# Patient Record
Sex: FEMALE | Race: WHITE | Employment: OTHER | URBAN - METROPOLITAN AREA
[De-identification: names, ages, dates, MRNs, and addresses within clinical notes are randomized per-mention and may not be internally consistent; named-entity substitution may affect disease eponyms.]

---

## 2017-02-05 ENCOUNTER — APPOINTMENT (OUTPATIENT)
Dept: GENERAL RADIOLOGY | Facility: HOSPITAL | Age: 82
DRG: 189 | End: 2017-02-05
Attending: EMERGENCY MEDICINE
Payer: MEDICARE

## 2017-02-05 ENCOUNTER — HOSPITAL ENCOUNTER (INPATIENT)
Facility: HOSPITAL | Age: 82
LOS: 5 days | Discharge: NURSING, REHAB OR LONG-TERM CARE | DRG: 189 | End: 2017-02-10
Attending: EMERGENCY MEDICINE | Admitting: STUDENT IN AN ORGANIZED HEALTH CARE EDUCATION/TRAINING PROGRAM
Payer: MEDICARE

## 2017-02-05 DIAGNOSIS — R79.89 ELEVATED BRAIN NATRIURETIC PEPTIDE (BNP) LEVEL: ICD-10-CM

## 2017-02-05 DIAGNOSIS — I48.91 ATRIAL FIBRILLATION WITH RAPID VENTRICULAR RESPONSE (HCC): ICD-10-CM

## 2017-02-05 DIAGNOSIS — R53.83 OTHER FATIGUE: Primary | ICD-10-CM

## 2017-02-05 DIAGNOSIS — J11.1 INFLUENZA: ICD-10-CM

## 2017-02-05 DIAGNOSIS — Z71.89 COUNSELING REGARDING GOALS OF CARE: ICD-10-CM

## 2017-02-05 DIAGNOSIS — Z51.5 PALLIATIVE CARE ENCOUNTER: ICD-10-CM

## 2017-02-05 DIAGNOSIS — J44.1 COPD EXACERBATION (HCC): ICD-10-CM

## 2017-02-05 PROBLEM — R73.9 HYPERGLYCEMIA: Status: ACTIVE | Noted: 2017-02-05

## 2017-02-05 PROBLEM — E87.6 HYPOKALEMIA: Status: ACTIVE | Noted: 2017-02-05

## 2017-02-05 PROBLEM — E87.3 METABOLIC ALKALOSIS: Status: ACTIVE | Noted: 2017-02-05

## 2017-02-05 LAB
ALBUMIN SERPL-MCNC: 3.3 G/DL (ref 3.5–4.8)
ALLENS TEST: POSITIVE
ALP LIVER SERPL-CCNC: 98 U/L (ref 55–142)
ALT SERPL-CCNC: 26 U/L (ref 14–54)
ANTIBODY SCREEN: NEGATIVE
ARTERIAL BLD GAS O2 SATURATION: 95 % (ref 92–100)
ARTERIAL BLOOD GAS BASE EXCESS: 5.7
ARTERIAL BLOOD GAS HCO3: 33.3 MEQ/L (ref 22–26)
ARTERIAL BLOOD GAS PCO2: 63 MM HG (ref 35–45)
ARTERIAL BLOOD GAS PH: 7.34 (ref 7.35–7.45)
ARTERIAL BLOOD GAS PO2: 84 MM HG (ref 80–105)
AST SERPL-CCNC: 30 U/L (ref 15–41)
BASOPHILS # BLD AUTO: 0.08 X10(3) UL (ref 0–0.1)
BASOPHILS NFR BLD AUTO: 0.9 %
BILIRUB SERPL-MCNC: 0.4 MG/DL (ref 0.1–2)
BILIRUB UR QL STRIP.AUTO: NEGATIVE
BUN BLD-MCNC: 18 MG/DL (ref 8–20)
CALCIUM BLD-MCNC: 8.9 MG/DL (ref 8.3–10.3)
CALCULATED O2 SATURATION: 96 % (ref 92–100)
CARBOXYHEMOGLOBIN: 1.6 % SAT (ref 0–3)
CHLORIDE: 101 MMOL/L (ref 101–111)
CO2: 35 MMOL/L (ref 22–32)
COLOR UR AUTO: YELLOW
CREAT BLD-MCNC: 0.83 MG/DL (ref 0.55–1.02)
EOSINOPHIL # BLD AUTO: 0.02 X10(3) UL (ref 0–0.3)
EOSINOPHIL NFR BLD AUTO: 0.2 %
ERYTHROCYTE [DISTWIDTH] IN BLOOD BY AUTOMATED COUNT: 14.4 % (ref 11.5–16)
EST. AVERAGE GLUCOSE BLD GHB EST-MCNC: 114 MG/DL (ref 68–126)
EXPIRATORY PRESSURE: 5 CM H2O
FIO2: 35 %
GLUCOSE BLD-MCNC: 166 MG/DL (ref 70–99)
GLUCOSE UR STRIP.AUTO-MCNC: NEGATIVE MG/DL
HAV IGM SER QL: 2 MG/DL (ref 1.7–3)
HBA1C MFR BLD HPLC: 5.6 % (ref ?–5.7)
HCT VFR BLD AUTO: 41.4 % (ref 34–50)
HGB BLD-MCNC: 13.5 G/DL (ref 12–16)
IMMATURE GRANULOCYTE COUNT: 0.12 X10(3) UL (ref 0–1)
IMMATURE GRANULOCYTE RATIO %: 1.3 %
INSP PRESSURE: 12 CM H2O
KETONES UR STRIP.AUTO-MCNC: NEGATIVE MG/DL
LEUKOCYTE ESTERASE UR QL STRIP.AUTO: NEGATIVE
LYMPHOCYTES # BLD AUTO: 0.57 X10(3) UL (ref 0.9–4)
LYMPHOCYTES NFR BLD AUTO: 6.3 %
M PROTEIN MFR SERPL ELPH: 7.5 G/DL (ref 6.1–8.3)
MCH RBC QN AUTO: 31.3 PG (ref 27–33.2)
MCHC RBC AUTO-ENTMCNC: 32.6 G/DL (ref 31–37)
MCV RBC AUTO: 96.1 FL (ref 81–100)
METHEMOGLOBIN: 0.8 % SAT (ref 0.4–1.5)
MONOCYTES # BLD AUTO: 1.09 X10(3) UL (ref 0.1–0.6)
MONOCYTES NFR BLD AUTO: 12.1 %
NEUTROPHIL ABS PRELIM: 7.1 X10 (3) UL (ref 1.3–6.7)
NEUTROPHILS # BLD AUTO: 7.1 X10(3) UL (ref 1.3–6.7)
NEUTROPHILS NFR BLD AUTO: 79.2 %
NITRITE UR QL STRIP.AUTO: NEGATIVE
PATIENT TEMPERATURE: 98.6 F
PH UR STRIP.AUTO: 6 [PH] (ref 4.5–8)
PLATELET # BLD AUTO: 237 10(3)UL (ref 150–450)
POTASSIUM SERPL-SCNC: 3.5 MMOL/L (ref 3.6–5.1)
PRO-BETA NATRIURETIC PEPTIDE: 6830 PG/ML (ref ?–450)
PROT UR STRIP.AUTO-MCNC: 30 MG/DL
RBC # BLD AUTO: 4.31 X10(6)UL (ref 3.8–5.1)
RBC UR QL AUTO: NEGATIVE
RED CELL DISTRIBUTION WIDTH-SD: 50.2 FL (ref 35.1–46.3)
RH BLOOD TYPE: POSITIVE
SODIUM SERPL-SCNC: 140 MMOL/L (ref 136–144)
SP GR UR STRIP.AUTO: 1.01 (ref 1–1.03)
TOTAL HEMOGLOBIN: 12.5 G/DL (ref 11.7–16)
TROPONIN: <0.046 NG/ML (ref ?–0.05)
TSI SER-ACNC: 1.45 MIU/ML (ref 0.35–5.5)
UROBILINOGEN UR STRIP.AUTO-MCNC: <2 MG/DL
VENOUS BASE EXCESS: 5.8
VENOUS BLOOD GAS HCO3: 35.8 MEQ/L (ref 23–27)
VENOUS LITERS PER MINUTE: 5 L/MIN
VENOUS O2 SAT CALC: 74 % (ref 72–78)
VENOUS O2 SATURATION: 78 % (ref 72–78)
VENOUS PCO2: 81 MM HG (ref 38–50)
VENOUS PH: 7.26 (ref 7.33–7.43)
VENOUS PO2: 45 MM HG (ref 30–50)
VENT RATE: 18 /MIN
WBC # BLD AUTO: 9 X10(3) UL (ref 4–13)

## 2017-02-05 PROCEDURE — 71010 XR CHEST AP PORTABLE  (CPT=71010): CPT

## 2017-02-05 PROCEDURE — 5A09357 ASSISTANCE WITH RESPIRATORY VENTILATION, LESS THAN 24 CONSECUTIVE HOURS, CONTINUOUS POSITIVE AIRWAY PRESSURE: ICD-10-PCS | Performed by: EMERGENCY MEDICINE

## 2017-02-05 PROCEDURE — 99223 1ST HOSP IP/OBS HIGH 75: CPT | Performed by: HOSPITALIST

## 2017-02-05 RX ORDER — METOPROLOL SUCCINATE 50 MG/1
50 TABLET, EXTENDED RELEASE ORAL DAILY
COMMUNITY
End: 2017-02-21

## 2017-02-05 RX ORDER — SODIUM CHLORIDE 9 MG/ML
1000 INJECTION, SOLUTION INTRAVENOUS CONTINUOUS
Status: DISCONTINUED | OUTPATIENT
Start: 2017-02-05 | End: 2017-02-05

## 2017-02-05 RX ORDER — IPRATROPIUM BROMIDE AND ALBUTEROL SULFATE 2.5; .5 MG/3ML; MG/3ML
3 SOLUTION RESPIRATORY (INHALATION) EVERY 4 HOURS
Status: DISCONTINUED | OUTPATIENT
Start: 2017-02-05 | End: 2017-02-07

## 2017-02-05 RX ORDER — METHYLPREDNISOLONE SODIUM SUCCINATE 125 MG/2ML
125 INJECTION, POWDER, LYOPHILIZED, FOR SOLUTION INTRAMUSCULAR; INTRAVENOUS ONCE
Status: COMPLETED | OUTPATIENT
Start: 2017-02-05 | End: 2017-02-05

## 2017-02-05 RX ORDER — LEVOTHYROXINE SODIUM 0.05 MG/1
50 TABLET ORAL
COMMUNITY
End: 2017-02-21

## 2017-02-05 RX ORDER — SODIUM CHLORIDE 9 MG/ML
INJECTION, SOLUTION INTRAVENOUS CONTINUOUS
Status: DISCONTINUED | OUTPATIENT
Start: 2017-02-05 | End: 2017-02-07

## 2017-02-05 RX ORDER — METOPROLOL SUCCINATE 50 MG/1
50 TABLET, EXTENDED RELEASE ORAL
Status: DISCONTINUED | OUTPATIENT
Start: 2017-02-06 | End: 2017-02-10

## 2017-02-05 RX ORDER — METHYLPREDNISOLONE SODIUM SUCCINATE 125 MG/2ML
80 INJECTION, POWDER, LYOPHILIZED, FOR SOLUTION INTRAMUSCULAR; INTRAVENOUS EVERY 8 HOURS
Status: DISCONTINUED | OUTPATIENT
Start: 2017-02-06 | End: 2017-02-06

## 2017-02-05 RX ORDER — DILTIAZEM HYDROCHLORIDE 5 MG/ML
INJECTION INTRAVENOUS
Status: COMPLETED
Start: 2017-02-05 | End: 2017-02-05

## 2017-02-05 RX ORDER — MULTIVITAMIN
1 TABLET ORAL DAILY
COMMUNITY
End: 2017-02-05

## 2017-02-05 RX ORDER — ONDANSETRON 2 MG/ML
4 INJECTION INTRAMUSCULAR; INTRAVENOUS EVERY 6 HOURS PRN
Status: DISCONTINUED | OUTPATIENT
Start: 2017-02-05 | End: 2017-02-10

## 2017-02-05 RX ORDER — OSELTAMIVIR PHOSPHATE 75 MG/1
75 CAPSULE ORAL 2 TIMES DAILY
Status: DISCONTINUED | OUTPATIENT
Start: 2017-02-06 | End: 2017-02-10

## 2017-02-05 RX ORDER — LEVOTHYROXINE SODIUM 0.05 MG/1
50 TABLET ORAL
Status: DISCONTINUED | OUTPATIENT
Start: 2017-02-06 | End: 2017-02-10

## 2017-02-05 RX ORDER — I-VITE, TAB 1000-60-2MG (60/BT) 300MCG-200
2 TAB ORAL DAILY
COMMUNITY

## 2017-02-05 RX ORDER — DILTIAZEM HYDROCHLORIDE 180 MG/1
180 CAPSULE, EXTENDED RELEASE ORAL 2 TIMES DAILY
COMMUNITY
End: 2017-02-21

## 2017-02-05 RX ORDER — DILTIAZEM HYDROCHLORIDE 180 MG/1
180 CAPSULE, EXTENDED RELEASE ORAL 2 TIMES DAILY
Status: DISCONTINUED | OUTPATIENT
Start: 2017-02-05 | End: 2017-02-10

## 2017-02-05 RX ORDER — ACETAMINOPHEN 325 MG/1
650 TABLET ORAL EVERY 6 HOURS PRN
Status: DISCONTINUED | OUTPATIENT
Start: 2017-02-05 | End: 2017-02-10

## 2017-02-05 RX ORDER — PANTOPRAZOLE SODIUM 40 MG/1
40 TABLET, DELAYED RELEASE ORAL
Status: DISCONTINUED | OUTPATIENT
Start: 2017-02-06 | End: 2017-02-06

## 2017-02-05 RX ORDER — IBUPROFEN 400 MG/1
400 TABLET ORAL EVERY 6 HOURS PRN
COMMUNITY
End: 2017-02-05

## 2017-02-05 RX ORDER — DILTIAZEM HYDROCHLORIDE 5 MG/ML
10 INJECTION INTRAVENOUS ONCE
Status: COMPLETED | OUTPATIENT
Start: 2017-02-05 | End: 2017-02-05

## 2017-02-05 NOTE — ED INITIAL ASSESSMENT (HPI)
Pt with increased weakness, fatigue, and dyspnea. Pt on home oxygen at 2-2.5L/min. Pt was in hospital 1/24-26 and was diagnosed with \"a respiratory infection\".

## 2017-02-05 NOTE — ED PROVIDER NOTES
Patient Seen in: [facility] Emergency Department    History   Patient presents with:  Fatigue (constitutional, neurologic)    Stated Complaint: incresed shortness of breath and generalized weakness.     HPI    Patient is a 80-year-old female comes in daily.       No family history on file.       Smoking Status: Former Smoker                   Packs/Day: 0.00  Years:           Quit date: 02/05/1987      Review of Systems    Positive for stated complaint: incresed shortness of breath and generalized weakn Pro-Beta Natriuretic Peptide 6830 (*)     All other components within normal limits   URINALYSIS WITH CULTURE REFLEX - Abnormal; Notable for the following:     Clarity Urine Hazy (*)     Protein Urine 30  (*)     RBC URINE 6-10 (*)     Bacteria Urine Ra -----------         ------                     BLOOD TYPE, ABO AND RH M[[id number]]                         Final result               ANTIBODY [id number]]                                  Final result                 Please view results for these juanis follow-up provider specified.     Medications Prescribed:  Current Discharge Medication List        Present on Admission           ICD-10-CM Noted POA    * (Principal)Other fatigue R53.83 2/5/2017 Unknown    Atrial fibrillation with rapid ventricular respon

## 2017-02-06 ENCOUNTER — APPOINTMENT (OUTPATIENT)
Dept: GENERAL RADIOLOGY | Facility: HOSPITAL | Age: 82
DRG: 189 | End: 2017-02-06
Attending: NURSE PRACTITIONER
Payer: MEDICARE

## 2017-02-06 LAB
ALBUMIN SERPL-MCNC: 2.8 G/DL (ref 3.5–4.8)
ALLENS TEST: POSITIVE
ALP LIVER SERPL-CCNC: 78 U/L (ref 55–142)
ALT SERPL-CCNC: 21 U/L (ref 14–54)
ARTERIAL BLD GAS O2 SATURATION: 95 % (ref 92–100)
ARTERIAL BLOOD GAS BASE EXCESS: 5
ARTERIAL BLOOD GAS HCO3: 31.3 MEQ/L (ref 22–26)
ARTERIAL BLOOD GAS PCO2: 55 MM HG (ref 35–45)
ARTERIAL BLOOD GAS PH: 7.38 (ref 7.35–7.45)
ARTERIAL BLOOD GAS PO2: 83 MM HG (ref 80–105)
AST SERPL-CCNC: 22 U/L (ref 15–41)
BILIRUB SERPL-MCNC: 0.4 MG/DL (ref 0.1–2)
BUN BLD-MCNC: 23 MG/DL (ref 8–20)
CALCIUM BLD-MCNC: 8.6 MG/DL (ref 8.3–10.3)
CALCULATED O2 SATURATION: 96 % (ref 92–100)
CARBOXYHEMOGLOBIN: 1.4 % SAT (ref 0–3)
CHLORIDE: 104 MMOL/L (ref 101–111)
CO2: 34 MMOL/L (ref 22–32)
CREAT BLD-MCNC: 0.93 MG/DL (ref 0.55–1.02)
ERYTHROCYTE [DISTWIDTH] IN BLOOD BY AUTOMATED COUNT: 14.2 % (ref 11.5–16)
GLUCOSE BLD-MCNC: 167 MG/DL (ref 70–99)
HAV IGM SER QL: 2.1 MG/DL (ref 1.7–3)
HCT VFR BLD AUTO: 35.5 % (ref 34–50)
HGB BLD-MCNC: 11.6 G/DL (ref 12–16)
M PROTEIN MFR SERPL ELPH: 6.4 G/DL (ref 6.1–8.3)
MCH RBC QN AUTO: 31.2 PG (ref 27–33.2)
MCHC RBC AUTO-ENTMCNC: 32.7 G/DL (ref 31–37)
MCV RBC AUTO: 95.4 FL (ref 81–100)
METHEMOGLOBIN: 0.7 % SAT (ref 0.4–1.5)
PATIENT TEMPERATURE: 98.6 F
PLATELET # BLD AUTO: 187 10(3)UL (ref 150–450)
POTASSIUM SERPL-SCNC: 3.6 MMOL/L (ref 3.6–5.1)
POTASSIUM SERPL-SCNC: 4.9 MMOL/L (ref 3.6–5.1)
RBC # BLD AUTO: 3.72 X10(6)UL (ref 3.8–5.1)
RED CELL DISTRIBUTION WIDTH-SD: 49.5 FL (ref 35.1–46.3)
SODIUM SERPL-SCNC: 145 MMOL/L (ref 136–144)
TOTAL HEMOGLOBIN: 11.2 G/DL (ref 11.7–16)
WBC # BLD AUTO: 5 X10(3) UL (ref 4–13)

## 2017-02-06 PROCEDURE — 71010 XR CHEST AP PORTABLE  (CPT=71010): CPT

## 2017-02-06 PROCEDURE — 99233 SBSQ HOSP IP/OBS HIGH 50: CPT | Performed by: HOSPITALIST

## 2017-02-06 RX ORDER — PREDNISONE 10 MG/1
40 TABLET ORAL
Status: DISCONTINUED | OUTPATIENT
Start: 2017-02-06 | End: 2017-02-08

## 2017-02-06 RX ORDER — POTASSIUM CHLORIDE 20 MEQ/1
40 TABLET, EXTENDED RELEASE ORAL EVERY 4 HOURS
Status: COMPLETED | OUTPATIENT
Start: 2017-02-06 | End: 2017-02-06

## 2017-02-06 RX ORDER — DOCUSATE SODIUM 100 MG/1
100 CAPSULE, LIQUID FILLED ORAL 2 TIMES DAILY PRN
Status: DISCONTINUED | OUTPATIENT
Start: 2017-02-06 | End: 2017-02-10

## 2017-02-06 RX ORDER — AZITHROMYCIN 250 MG/1
500 TABLET, FILM COATED ORAL
Status: DISCONTINUED | OUTPATIENT
Start: 2017-02-06 | End: 2017-02-08

## 2017-02-06 NOTE — PHYSICAL THERAPY NOTE
PHYSICAL THERAPY EVALUATION - INPATIENT     Room Number: 455/455-A  Evaluation Date: 2/6/2017  Type of Evaluation: Initial  Physician Order: PT Eval and Treat    Presenting Problem: Flu, a-fib with RVR  Reason for Therapy: Mobility Dysfunction and Disc driving recently, (states daughter always has her car). Reports she has not been able to do laundry or prepare meals due to weakness and fatigue for several weeks.      SUBJECTIVE  No c/o pain, dizziness, nausea, with the exception of left knee pain when s assessment = 85/36; sitting prior to standing activity = 98/41. AM-PAC '6-Clicks' INPATIENT SHORT FORM - BASIC MOBILITY  How much difficulty does the patient currently have. ..  -   Turning over in bed (including adjusting bedclothes, sheets and blanket been falling. Functional activity tolerated    Patient End of Session: Up in chair; With [address] staff;Needs met;Call light within reach; All patient questions and concerns addressed;RN aware of session/findings    Eval completed.     ASSESSMENT     Patient is Compliance with activity recommendations.      Goal #5    Goal #6    Goal Comments: Goals established on 2/6/2017

## 2017-02-06 NOTE — PROGRESS NOTES
ICU  Critical Care APN Progress Note    NAME: Lianne Peña - ROOM: [address] [place] [address] - MRN: [id number] - Age: 80year old - :1935    History Of Present Illness:  Lianne Peña is a 80year old female who admits with weakness and shortness of breath that s was obtained. COMPARISON:  None. INDICATIONS:  incresed shortness of breath and generalized weakness. PATIENT STATED HISTORY:  Patient is here for weakness and increased shortness of breath. Congested cough.         2/5/2017  CONCLUSION:  Hyperinflation

## 2017-02-06 NOTE — H&P
MISTY HOSPITALIST  History and Physical     Deb Sanders Patient Status:  Emergency    1935 MRN [id number]   [address] Attending Dinesh Cortez MD   Hosp Day # 0 PCP None Pcp     Chief Complaint: Weakness 100%  General: Moderate respiratory distress on BIPAP  HEENT: Normocephalic atraumatic. Moist mucous membranes. Respiratory: Poor air exchange, mild wheeze, no crackles  Cardiovascular: S1, S2. Irregular  Abdomen: Soft, nontender, nondistended.   Positive

## 2017-02-06 NOTE — CONSULTS
Critical Care H&P/Consult       NAME: Kait Redmond - ROOM: [address] [place], [place] [address] - MRN: [id number] - Age: 80year old - :  1935    Date of Admission: 2017  3:13 PM  Admission Diagnosis: COPD exacerbation ([facility] [J44.1]  Atrial fibrillation with rapid ventri Oral Tab Take 2 tablets by mouth daily. Disp:  Rfl:  2/5/2017 at 1100   DiltiaZEM HCl  MG Oral Capsule SR 24 Hr Take 180 mg by mouth 2 (two) times daily.  Disp:  Rfl:  2/5/2017 at 1100     No Active Allergies    Social History:    Social History   Mar Q24H   • pantoprazole (PROTONIX) IV push  40 mg Intravenous QAM AC    Or   • Pantoprazole Sodium  40 mg Oral QAM AC   • Oseltamivir Phosphate  75 mg Oral BID     Continuous Infusing Medication:  • albuterol Sulfate     • Ipratropium Bromide     • sodium ch eyes   Throat:   Lips, mucosa, and tongue normal; teeth and gums normal   Neck:   Supple, symmetrical, trachea midline, no adenopathy;        thyroid:  No enlargement/tenderness/nodules; no carotid    bruit or JVD   Back:     Symmetric, no curvature, ROM n steroids  -need OPT pfts and outside records  -BD protocol  -azithro x 3 days  4. A-fib  -on eliquis  -cont, monitor rates  5. 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  H/o TB  -underwent 1 year of therapy per pt ~30yrs a

## 2017-02-06 NOTE — CM/SW NOTE
02/06/17 1500   CM/SW Referral Data   Referral Source Physician   Reason for Referral Discharge planning   Informant Patient; Children   Social History   Recreational Drug/Alcohol Use no   Major Changes Last 6 Months no   Domestic/Partner Violence no   S

## 2017-02-06 NOTE — PLAN OF CARE
CARDIOVASCULAR - ADULT    • Maintains optimal cardiac output and hemodynamic stability Progressing        NEUROLOGICAL - ADULT    • Achieves stable or improved neurological status Progressing        RESPIRATORY - ADULT    • Achieves optimal ventilation and

## 2017-02-06 NOTE — PROGRESS NOTES
MISTY HOSPITALIST  Progress Note     Keisha Escobar Patient Status:  Inpatient    1935 MRN [id number]   [facility] 4SW-A Attending Aviva More MD   Hosp Day # 1 PCP None Pcp     Chief Complaint: SOB    S: Patient reports she never • Levothyroxine Sodium  50 mcg Oral Before breakfast   • Metoprolol Succinate ER  50 mg Oral Daily Beta Blocker   • ipratropium-albuterol  3 mL Nebulization Q4H   • pantoprazole (PROTONIX) IV push  40 mg Intravenous QAM AC    Or   • Pantoprazole Sodium 98  78   AST  30  22   ALT  26  21   BILT  0.4  0.4   TP  7.5  6.4     Impression: Acute hypercapneic respiratory failure d/t COPD exacerbation triggered by influenza, improving    Plan:  Zithro  Steroid taper  Nebs  Discharge Elvira Ornelas MD

## 2017-02-06 NOTE — OCCUPATIONAL THERAPY NOTE
OCCUPATIONAL THERAPY EVALUATION - INPATIENT     Room Number: 455/455-A  Evaluation Date: 2/6/2017  Type of Evaluation: Initial  Presenting Problem: Influenza A; COPD exacerbation; AFIB w/ RVR; fatigue    Physician Order: IP Consult to Occupational Therapy )  Patient Regularly Uses: Glasses; Home O2    Prior Level of Pershing: Per patient, she was independent with self-care and functional mobility until 1 week ago without use of AD.   [place] moved [place] from [facility] 2/1/17 and states she bought a home her ADDITIONAL TESTS  Dynamometer  Strength Left: 25  Dynamometer  Strength Right: 30     MOCA: 21/30     Details from examination are as follow:    Visuospatial/Executive: 5/5  Naming: 3/3  Attention: 5/6  Language: 3/3  Abstraction: 2/2  Delayed TRANSFER ASSESSMENT  Supine to Sit : Not tested  Sit to Stand: Minimum assistance    Skilled Therapy Provided: Patient educated on OT role, goals, plan of care, recommendations and safety.   Patient transferred sit to stand from chair with min A and use of moderate complexity OT intervention to address the above deficits, maximizing patient's ability to return to prior level of function.     Patient is not safe to return home at current functional level d/t high risk for recurrent falls, decline in ADL partic

## 2017-02-07 LAB
ATRIAL RATE: 98 BPM
BUN BLD-MCNC: 36 MG/DL (ref 8–20)
CALCIUM BLD-MCNC: 7.9 MG/DL (ref 8.3–10.3)
CHLORIDE: 106 MMOL/L (ref 101–111)
CO2: 28 MMOL/L (ref 22–32)
CREAT BLD-MCNC: 0.9 MG/DL (ref 0.55–1.02)
ERYTHROCYTE [DISTWIDTH] IN BLOOD BY AUTOMATED COUNT: 14.5 % (ref 11.5–16)
GLUCOSE BLD-MCNC: 144 MG/DL (ref 70–99)
HCT VFR BLD AUTO: 31.7 % (ref 34–50)
HGB BLD-MCNC: 10.3 G/DL (ref 12–16)
MCH RBC QN AUTO: 31.3 PG (ref 27–33.2)
MCHC RBC AUTO-ENTMCNC: 32.5 G/DL (ref 31–37)
MCV RBC AUTO: 96.4 FL (ref 81–100)
PLATELET # BLD AUTO: 174 10(3)UL (ref 150–450)
POTASSIUM SERPL-SCNC: 4.6 MMOL/L (ref 3.6–5.1)
Q-T INTERVAL: 294 MS
QRS DURATION: 68 MS
QTC CALCULATION (BEZET): 395 MS
R AXIS: 109 DEGREES
RBC # BLD AUTO: 3.29 X10(6)UL (ref 3.8–5.1)
RED CELL DISTRIBUTION WIDTH-SD: 51.1 FL (ref 35.1–46.3)
SODIUM SERPL-SCNC: 139 MMOL/L (ref 136–144)
T AXIS: 57 DEGREES
VENTRICULAR RATE: 109 BPM
WBC # BLD AUTO: 13.9 X10(3) UL (ref 4–13)

## 2017-02-07 PROCEDURE — 99232 SBSQ HOSP IP/OBS MODERATE 35: CPT | Performed by: HOSPITALIST

## 2017-02-07 RX ORDER — IPRATROPIUM BROMIDE AND ALBUTEROL SULFATE 2.5; .5 MG/3ML; MG/3ML
3 SOLUTION RESPIRATORY (INHALATION) EVERY 4 HOURS PRN
Status: DISCONTINUED | OUTPATIENT
Start: 2017-02-07 | End: 2017-02-10

## 2017-02-07 RX ORDER — IPRATROPIUM BROMIDE AND ALBUTEROL SULFATE 2.5; .5 MG/3ML; MG/3ML
3 SOLUTION RESPIRATORY (INHALATION)
Status: DISCONTINUED | OUTPATIENT
Start: 2017-02-07 | End: 2017-02-10

## 2017-02-07 NOTE — PLAN OF CARE
0100: Pt received AOx4. 5L NC. At-fib. IVF NS at 75 cc/hr. RAC IV site. Denies pain. Droplet Precaution. 0600: Pt slept well. Ambulated to the bathroom urinated and had BM. No c/o pain. VSS.     Impaired Activities of Daily Living    • Achieve highest/safe

## 2017-02-07 NOTE — PROGRESS NOTES
[address] [place] [place] Patient Status:  Inpatient    1935 MRN [id number]   [facility] 4SW-A Attending Manisha Gomez MD   [facility] Day # 2 PCP None Pcp     Critical Care Progress Note     Date of Admission: 2017  3:13 PM  Ad this shift:  In: 9999 [P.O.:220;  I.V.:1251]  Out: 250 [Urine:250]     General appearance: alert, appears stated age and cooperative  Lungs: clear to auscultation bilaterally  Heart: regular rate and rhythm  Abdomen: soft, non-tender; bowel sounds normal; n

## 2017-02-07 NOTE — PLAN OF CARE
Assumed pt care at 0730. Aa/ox4. Breathing unlabored at rest with sob with exertion, wean oxygen as able to. Denies pain. Tele a-fib with cvr. Droplet isolation. Cleared for transfer, awaiting bed. Daughter at bedside. Updated.

## 2017-02-07 NOTE — CERTIFICATION
**Certification  PHYSICIAN Certification of Need for Inpatient Hospitalization - Initial Certification  Patient will require inpatient services that will reasonably be expected to span two midnight's based on the clinical documentation in H+P.   Based on pa

## 2017-02-07 NOTE — PROGRESS NOTES
MISTY HOSPITALIST  Progress Note     Baljit Torres Patient Status:  Inpatient    1935 MRN [id number]   [facility] 4SW-A Attending Kevyn Tam MD   Hosp Day # 2 PCP None Pcp     Chief Complaint: SOB    S: Patient reports she has no 2.5 mg Oral BID   • DiltiaZEM HCl ER  180 mg Oral BID   • Levothyroxine Sodium  50 mcg Oral Before breakfast   • Metoprolol Succinate ER  50 mg Oral Daily Beta Blocker   • ipratropium-albuterol  3 mL Nebulization Q4H   • Oseltamivir Phosphate  75 mg Oral B 0.90   CA  8.9  8.6   --   7.9*   ALB  3.3*  2.8*   --    --    NA  140  145*   --   139   K  3.5*  3.6  4.9  4.6   CL  101  104   --   106   CO2  35.0*  34.0*   --   28.0   ALKPHO  98  78   --    --    AST  30  22   --    --    ALT  26  21   --    --    B

## 2017-02-07 NOTE — PLAN OF CARE
Pulse oximetry check on oxygen at 3L/min per NC. Before ambulation: 93%. During ambulation: 91%  After ambulation: 92%.

## 2017-02-08 PROCEDURE — 99232 SBSQ HOSP IP/OBS MODERATE 35: CPT | Performed by: STUDENT IN AN ORGANIZED HEALTH CARE EDUCATION/TRAINING PROGRAM

## 2017-02-08 RX ORDER — OSELTAMIVIR PHOSPHATE 75 MG/1
75 CAPSULE ORAL 2 TIMES DAILY
Qty: 4 CAPSULE | Refills: 0 | Status: SHIPPED | OUTPATIENT
Start: 2017-02-08 | End: 2017-02-10

## 2017-02-08 RX ORDER — IPRATROPIUM BROMIDE AND ALBUTEROL SULFATE 2.5; .5 MG/3ML; MG/3ML
3 SOLUTION RESPIRATORY (INHALATION) EVERY 4 HOURS PRN
Qty: 360 ML | Refills: 0 | Status: SHIPPED | OUTPATIENT
Start: 2017-02-08 | End: 2017-03-10

## 2017-02-08 RX ORDER — PREDNISONE 10 MG/1
TABLET ORAL
Qty: 18 TABLET | Refills: 0 | Status: SHIPPED | OUTPATIENT
Start: 2017-02-08 | End: 2017-02-10

## 2017-02-08 NOTE — DISCHARGE SUMMARY
MISTY HOSPITALIST  DISCHARGE SUMMARY     Vega Rivas Patient Status:  Inpatient    1935 MRN [id number]   [facility] 4SW-A Attending Hilaria Kelly MD   [facility] Day # 5 PCP None Pcp     Date of Admission: 2017  Date of Discharge:  weak she was unable to sit up in bed. Patient unable to provide history. Daughter provides history. Brief Synopsis: Patient is a 79-year-old female with history of chronic hypoxia on O2 prior to admission. She presented with shortness of breath.   She w ELIQUIS        Take 2.5 mg by mouth 2 (two) times daily. Refills:  0       DiltiaZEM HCl  MG Cp24   Last time this was given:  180 mg on 2/10/2017  8:28 AM   Commonly known as:  DILACOR XR        Take 180 mg by mouth 2 (two) times daily.     Refill

## 2017-02-08 NOTE — PLAN OF CARE
RESPIRATORY - ADULT    • Achieves optimal ventilation and oxygenation Progressing          Pt received on 3 L nc. Denies pain, shortness of breath, dizziness or lightheadedness. No changes in vision noted. Per pt no signs of bleeding noted.  States she is c

## 2017-02-08 NOTE — PROGRESS NOTES
[address] [place] Patient Status:  Inpatient    1935 MRN [id number]   [facility] 4SW-A Attending Claritza Kim MD   Hosp Day # 3 PCP None Pcp     Pulm / Critical Care Progress Note     S: Pt states she feels ok.  Nahomy Maldonado 0431   NA  140  145*   --   139   K  3.5*  3.6  4.9  4.6   CL  101  104   --   106   CO2  35.0*  34.0*   --   28.0   BUN  18  23*   --   36*       CREATININE (mg/dL)   Date Value   02/07/2017 0.90   02/06/2017 0.93   02/05/2017 0.83   ----------]    Recent

## 2017-02-08 NOTE — PROGRESS NOTES
MISTY HOSPITALIST  Progress Note     Oumar Minors Patient Status:  Inpatient    1935 MRN [id number]   [facility] 4SW-A Attending Licha Mae MD   Hosp Day # 3 PCP None Pcp     Chief Complaint: SOB    S: Patient reports she has no 2.5 mg Oral BID   • DiltiaZEM HCl ER  180 mg Oral BID   • Levothyroxine Sodium  50 mcg Oral Before breakfast   • Metoprolol Succinate ER  50 mg Oral Daily Beta Blocker   • Oseltamivir Phosphate  75 mg Oral BID       ASSESSMENT / PLAN:     1.  Acute on chron

## 2017-02-08 NOTE — PLAN OF CARE
CARDIOVASCULAR - ADULT    • Maintains optimal cardiac output and hemodynamic stability Progressing    • Absence of cardiac arrhythmias or at baseline Progressing          RESPIRATORY - ADULT    • Achieves optimal ventilation and oxygenation Progressing

## 2017-02-08 NOTE — CM/SW NOTE
Advised earlier by [facility], that they will accept pt to their facility, but cannot take pt into their facility until patient has been on tamiflu for 5 days (10 doses) for the influenza A.     Currently pt on day 3 of the required 5 days o

## 2017-02-08 NOTE — CM/SW NOTE
Advised per RUBÉN Meneses that pt is medically cleared for discharge. Per Raúl Meneses, pt/daughter chose [employer] as [facility] choice. Sent ECIN referral to [facility] to see if they can accept pt? If so, can they accept today? Await response.   Ema Donis

## 2017-02-09 PROBLEM — Z51.5 PALLIATIVE CARE ENCOUNTER: Status: ACTIVE | Noted: 2017-02-09

## 2017-02-09 PROBLEM — Z71.89 COUNSELING REGARDING GOALS OF CARE: Status: ACTIVE | Noted: 2017-02-09

## 2017-02-09 PROCEDURE — 99221 1ST HOSP IP/OBS SF/LOW 40: CPT | Performed by: CLINICAL NURSE SPECIALIST

## 2017-02-09 PROCEDURE — 99232 SBSQ HOSP IP/OBS MODERATE 35: CPT | Performed by: STUDENT IN AN ORGANIZED HEALTH CARE EDUCATION/TRAINING PROGRAM

## 2017-02-09 PROCEDURE — 99497 ADVNCD CARE PLAN 30 MIN: CPT | Performed by: CLINICAL NURSE SPECIALIST

## 2017-02-09 NOTE — PLAN OF CARE
Impaired Activities of Daily Living    • Achieve highest/safest level of independence in self care Not Progressing        Impaired Functional Mobility    • Achieve highest/safest level of mobility/gait Not Progressing        MUSCULOSKELETAL - ADULT    • Re

## 2017-02-09 NOTE — PROGRESS NOTES
[address] [place] Patient Status:  Inpatient    1935 MRN [id number]   [facility] 4SW-A Attending Lakeisha Cortes MD   Hosp Day # 4 PCP None Pcp     Pulm / Critical Care Progress Note     S: Pt states that she feels ok.   D Date Value   02/07/2017 0.90   02/06/2017 0.93   02/05/2017 0.83   ----------]    No results for input(s): ALKPHOS, ALT, AST in the last 72 hours. Invalid input(s): BILITOT, BILIDIR, PROT, LABALBU       ASSESSMENT/PLAN:    1.  Acute on Chronic Resp Bhaskar

## 2017-02-09 NOTE — PROGRESS NOTES
MISTY HOSPITALIST  Progress Note     Deb Sanders Patient Status:  Inpatient    1935 MRN [id number]   [facility] 4SW-A Attending Jerri Barker MD   Hosp Day # 4 PCP None Pcp     Chief Complaint: SOB    S: Patient reports no complaint consult  2. COPD exacerbation, See above  3. Influenza see above  4. History of TB  5. Possible chronic CHF  1. Obtain ECHO from OSH  6. Atrial fib, stable  1. cardizem and toprol XL, on Eliquis  7. Hyperglycemia 2/2 steroids, 5.6%  8.  Hypothyroidism, Synt

## 2017-02-09 NOTE — OCCUPATIONAL THERAPY NOTE
OCCUPATIONAL THERAPY TREATMENT NOTE - INPATIENT     Room Number: 455/455-A  Session: 1  Number of Visits to Meet Established Goals: 7    Presenting Problem: Influenza A; COPD exacerbation; AFIB w/ RVR; fatigue    History related to current admission:   [place] cannula  Liters of O2:  3  Shortness of breath   90% with activity  VENITA RPE: 6/10    ACTIVITIES OF DAILY LIVING ASSESSMENT  AM-PAC ‘6-Clicks’ Inpatient Daily Activity Short Form  How much help from another person does the patient currently need…  -   Putt endurance. Patient would benefit from continued OT services during hospital stay to further address these deficits and assist patient in returning to prior level of function. Continues to be appropriate for [facility] prior to discharge home.     OT Discharge Rec

## 2017-02-09 NOTE — PLAN OF CARE
CARDIOVASCULAR - ADULT    • Maintains optimal cardiac output and hemodynamic stability Progressing        MUSCULOSKELETAL - ADULT    • Return mobility to safest level of function Progressing        NEUROLOGICAL - ADULT    • Achieves stable or improved neur

## 2017-02-09 NOTE — CONSULTS
[address] [facility]  [id number]  Hospital Day #4  Date of Consult: 02/09/17       Reason for Consultation: Consult requested for evaluation of palliative care needs and goals of care discussion.     H Also discussed significance of palliative care continuing to see patient at [facility] and at home to continue [address] discussions and assist with symptom management has COPD progresses.   Patient would like to discuss recommendation further with daughter prior to colby

## 2017-02-09 NOTE — PHYSICAL THERAPY NOTE
PHYSICAL THERAPY TREATMENT NOTE - INPATIENT    Room Number: 455/455-A     Session: 1   Number of Visits to Meet Established Goals: 5    Presenting Problem: Flu, a-fib with RVR    Problem List  Principal Problem:    Other fatigue  Active Problems:    Hypok bedclothes, sheets and blankets)?: None   -   Sitting down on and standing up from a chair with arms (e.g., wheelchair, bedside commode, etc.): None   -   Moving from lying on back to sitting on the side of the bed?: None   How much help from another perso cues for pacing of activity. Modified Godwin balance score improved slightly, but remains at high risk for falls.   Pt will benefit from cont skilled therapy for pulmonary endurance, le strengthening, balance retraining, education on pacing and safety techni

## 2017-02-10 VITALS
RESPIRATION RATE: 20 BRPM | TEMPERATURE: 98 F | HEIGHT: 62 IN | HEART RATE: 72 BPM | DIASTOLIC BLOOD PRESSURE: 53 MMHG | OXYGEN SATURATION: 97 % | BODY MASS INDEX: 22.75 KG/M2 | SYSTOLIC BLOOD PRESSURE: 107 MMHG | WEIGHT: 123.63 LBS

## 2017-02-10 LAB
BAND %: 2 %
BASOPHIL % MANUAL: 0 %
BASOPHIL ABSOLUTE MANUAL: 0 X10(3) UL (ref 0–0.1)
BUN BLD-MCNC: 29 MG/DL (ref 8–20)
CALCIUM BLD-MCNC: 8.9 MG/DL (ref 8.3–10.3)
CHLORIDE: 104 MMOL/L (ref 101–111)
CO2: 28 MMOL/L (ref 22–32)
CREAT BLD-MCNC: 0.79 MG/DL (ref 0.55–1.02)
EOSINOPHIL % MANUAL: 1 %
EOSINOPHIL ABSOLUTE MANUAL: 0.12 X10(3) UL (ref 0–0.3)
ERYTHROCYTE [DISTWIDTH] IN BLOOD BY AUTOMATED COUNT: 14.3 % (ref 11.5–16)
GLUCOSE BLD-MCNC: 94 MG/DL (ref 70–99)
HCT VFR BLD AUTO: 36.5 % (ref 34–50)
HGB BLD-MCNC: 12 G/DL (ref 12–16)
LYMPHOCYTE % MANUAL: 12 %
LYMPHOCYTE ABSOLUTE MANUAL: 1.4 X10(3) UL (ref 0.9–4)
MCH RBC QN AUTO: 31 PG (ref 27–33.2)
MCHC RBC AUTO-ENTMCNC: 32.9 G/DL (ref 31–37)
MCV RBC AUTO: 94.3 FL (ref 81–100)
METAMYELOCYTE %: 2 %
METAMYELOCYTE ABSOLUTE MANUAL: 0.23 X10(3) UL (ref ?–0.01)
MONOCYTE % MANUAL: 6 %
MONOCYTE ABSOLUTE MANUAL: 0.7 X10(3) UL (ref 0.1–0.6)
MORPHOLOGY: NORMAL
NEUTROPHIL ABS PRELIM: 8.73 X10 (3) UL (ref 1.3–6.7)
NEUTROPHIL ABSOLUTE MANUAL: 9.24 X10(3) UL (ref 1.3–6.7)
NEUTROPHILS % MANUAL: 77 %
PLATELET # BLD AUTO: 208 10(3)UL (ref 150–450)
PLATELET MORPHOLOGY: NORMAL
POTASSIUM SERPL-SCNC: 4.1 MMOL/L (ref 3.6–5.1)
RBC # BLD AUTO: 3.87 X10(6)UL (ref 3.8–5.1)
RED CELL DISTRIBUTION WIDTH-SD: 49.1 FL (ref 35.1–46.3)
SODIUM SERPL-SCNC: 138 MMOL/L (ref 136–144)
TOTAL CELLS COUNTED: 100
WBC # BLD AUTO: 11.7 X10(3) UL (ref 4–13)

## 2017-02-10 PROCEDURE — 99239 HOSP IP/OBS DSCHRG MGMT >30: CPT | Performed by: STUDENT IN AN ORGANIZED HEALTH CARE EDUCATION/TRAINING PROGRAM

## 2017-02-10 RX ORDER — PREDNISONE 10 MG/1
TABLET ORAL
Qty: 16 TABLET | Refills: 0 | Status: SHIPPED | OUTPATIENT
Start: 2017-02-10 | End: 2017-03-09

## 2017-02-10 NOTE — PROGRESS NOTES
MISTY HOSPITALIST  Progress Note     Shikha Caal Patient Status:  Inpatient    1935 MRN [id number]   [facility] 4SW-A Attending Alexander Frost MD   Hosp Day # 5 PCP None Pcp     Chief Complaint: SOB    S: Patient reports no complaint CHF, compensated  6. Atrial fib, stable  1. cardizem and toprol XL, on Eliquis  7. Hyperglycemia 2/2 steroids, 5.6%  8. Hypothyroidism, Synthroid  9. Metabolic alkalosis, resolved  10. Hypokalemia, resolved  11.  Leukocytosis 2/2 steroids    Quality:  · DVT

## 2017-02-10 NOTE — PROGRESS NOTES
Received pt alert and oriented x 4. VSS. Afib with controlled rate. Pt up to bathroom x 1 assist. Transfer orders in place. Ok to room 509. Pt updated and will notify daughters. All belongings packed. Report called to [facility].  Pt transferred via w/c w

## 2017-02-10 NOTE — PLAN OF CARE
NURSING DISCHARGE NOTE    Discharged to Rehab facility via Wheelchair. Accompanied by Family member  Belongings Taken by patient/family. PIV removed. Discharge navigator complete. Discharge paperwork & imaging discs sent with patient.  Daughter to t

## 2017-02-10 NOTE — PALLIATIVE CARE NOTE
[facility] Work Follow Up    Individuals present:  pt    Coping Status: _x___Coping well     _____Coping with some difficulty     _____Difficulty coping      Emotional Status: _____Anxious Mental Status: _x____Alert

## 2017-02-10 NOTE — PLAN OF CARE
Patient/Family Goals    • Patient/Family Short Term Goal Progressing        RESPIRATORY - ADULT    • Achieves optimal ventilation and oxygenation Progressing        Received pt from ICU. Pt A/O x 3, no distress noted. Pt has no complaints of pain.  Informed

## 2017-02-10 NOTE — PLAN OF CARE
CARDIOVASCULAR - ADULT    • Maintains optimal cardiac output and hemodynamic stability Progressing    • Absence of cardiac arrhythmias or at baseline Progressing        Impaired Activities of Daily Living    • Achieve highest/safest level of independence i continent. Patient & family updated on POC, rounded on routinely.

## 2017-02-10 NOTE — CM/SW NOTE
Spoke with pt's RN who stated pt can discharge to NH today. She has completed her doses of tamiflu. Updates sent to [facility] via [facility]. Spoke with Leyla from the [facility] who confirmed pt can be accepted for admission today.   She requested 3:30pm disc

## 2017-02-10 NOTE — PROGRESS NOTES
Pulmonary Progress Note      NAME: Zoe Bragg - [facility] - MRN: [id number] - Age: 80year old - : 1935    Assessment/Plan:  1.  Acute on Chronic Resp Failure- improved  -due to acute influenza and COPD exacerbation  -off BiPAP  - titrate O2 normal S1S2, no murmur. Abdomen: soft, non-tender, non-distended, positive BS. Extremity: no edema   Skin: No rashes or lesions.     Recent Labs   Lab  02/10/17   0540   RBC  3.87   HGB  12.0   HCT  36.5   MCV  94.3   MCH  31.0   MCHC  32.9   RDW  14.3

## 2017-02-10 NOTE — CM/SW NOTE
02/10/17 1600   Discharge disposition   Discharged to: Skilled Nurs   Name of [facility]   Outpatient services Palliative  ([facility])   Discharge transportation Private car

## 2017-02-13 ENCOUNTER — SNF ADMIT/H&P (OUTPATIENT)
Dept: INTERNAL MEDICINE CLINIC | Facility: CLINIC | Age: 82
End: 2017-02-13

## 2017-02-13 DIAGNOSIS — J11.1 INFLUENZA: ICD-10-CM

## 2017-02-13 DIAGNOSIS — Z86.11 H/O TB (TUBERCULOSIS): ICD-10-CM

## 2017-02-13 DIAGNOSIS — I48.20 CHRONIC ATRIAL FIBRILLATION (HCC): ICD-10-CM

## 2017-02-13 DIAGNOSIS — J96.01 ACUTE RESPIRATORY FAILURE WITH HYPOXIA (HCC): Primary | ICD-10-CM

## 2017-02-13 DIAGNOSIS — J44.1 COPD EXACERBATION (HCC): ICD-10-CM

## 2017-02-13 PROCEDURE — 99306 1ST NF CARE HIGH MDM 50: CPT | Performed by: INTERNAL MEDICINE

## 2017-02-14 NOTE — PROGRESS NOTES
Patient was discharged from [facility] on Friday, February 10 to the [place]. She was admitted with acute on chronic respiratory failure due to acute influenza and COPD exacerbation.   She was able to be titrated back to her baseline of 2 L nasal parris nondistended, nontender, normal bowel sounds  Extremities: No cyanosis, no edema, peripheral pulses palpable  Neuro: Grossly nonfocal with some weakness noted. Impression: 1. Acute on chronic respiratory failure, 2. Influenza, 3.   COPD exacerbation, 4

## 2017-02-15 ENCOUNTER — SNF VISIT (OUTPATIENT)
Dept: INTERNAL MEDICINE CLINIC | Facility: CLINIC | Age: 82
End: 2017-02-15

## 2017-02-15 DIAGNOSIS — J44.1 COPD EXACERBATION (HCC): ICD-10-CM

## 2017-02-15 DIAGNOSIS — J96.01 ACUTE RESPIRATORY FAILURE WITH HYPOXIA (HCC): Primary | ICD-10-CM

## 2017-02-15 DIAGNOSIS — I48.20 CHRONIC ATRIAL FIBRILLATION (HCC): ICD-10-CM

## 2017-02-15 DIAGNOSIS — J11.1 INFLUENZA: ICD-10-CM

## 2017-02-15 PROCEDURE — 99307 SBSQ NF CARE SF MDM 10: CPT | Performed by: INTERNAL MEDICINE

## 2017-02-20 NOTE — PROGRESS NOTES
Patient was seen today in follow-up for her recent influenza, acute respiratory failure, COPD exacerbation, and A. fib. She denied any chest pain, shortness of breath, nausea or vomiting. Her bowels and bladder are working adequately.   She feels fairly w

## 2017-04-12 ENCOUNTER — APPOINTMENT (OUTPATIENT)
Dept: GENERAL RADIOLOGY | Facility: HOSPITAL | Age: 82
End: 2017-04-12
Attending: EMERGENCY MEDICINE
Payer: MEDICARE

## 2017-04-12 ENCOUNTER — HOSPITAL ENCOUNTER (EMERGENCY)
Facility: HOSPITAL | Age: 82
Discharge: HOME | End: 2017-04-12
Attending: EMERGENCY MEDICINE
Payer: MEDICARE

## 2017-04-12 VITALS
RESPIRATION RATE: 19 BRPM | SYSTOLIC BLOOD PRESSURE: 132 MMHG | BODY MASS INDEX: 19 KG/M2 | DIASTOLIC BLOOD PRESSURE: 81 MMHG | WEIGHT: 117 LBS | HEART RATE: 91 BPM | TEMPERATURE: 100 F | OXYGEN SATURATION: 93 %

## 2017-04-12 DIAGNOSIS — R60.0 EXTREMITY EDEMA: Primary | ICD-10-CM

## 2017-04-12 PROCEDURE — 93005 ELECTROCARDIOGRAM TRACING: CPT

## 2017-04-12 PROCEDURE — 84484 ASSAY OF TROPONIN QUANT: CPT | Performed by: EMERGENCY MEDICINE

## 2017-04-12 PROCEDURE — 99285 EMERGENCY DEPT VISIT HI MDM: CPT

## 2017-04-12 PROCEDURE — 83880 ASSAY OF NATRIURETIC PEPTIDE: CPT | Performed by: EMERGENCY MEDICINE

## 2017-04-12 PROCEDURE — 87086 URINE CULTURE/COLONY COUNT: CPT | Performed by: EMERGENCY MEDICINE

## 2017-04-12 PROCEDURE — 93010 ELECTROCARDIOGRAM REPORT: CPT

## 2017-04-12 PROCEDURE — 80053 COMPREHEN METABOLIC PANEL: CPT | Performed by: EMERGENCY MEDICINE

## 2017-04-12 PROCEDURE — 85610 PROTHROMBIN TIME: CPT | Performed by: EMERGENCY MEDICINE

## 2017-04-12 PROCEDURE — 85730 THROMBOPLASTIN TIME PARTIAL: CPT | Performed by: EMERGENCY MEDICINE

## 2017-04-12 PROCEDURE — 96375 TX/PRO/DX INJ NEW DRUG ADDON: CPT

## 2017-04-12 PROCEDURE — 71010 XR CHEST AP PORTABLE  (CPT=71010): CPT

## 2017-04-12 PROCEDURE — 81001 URINALYSIS AUTO W/SCOPE: CPT | Performed by: EMERGENCY MEDICINE

## 2017-04-12 PROCEDURE — 87040 BLOOD CULTURE FOR BACTERIA: CPT | Performed by: EMERGENCY MEDICINE

## 2017-04-12 PROCEDURE — 36415 COLL VENOUS BLD VENIPUNCTURE: CPT

## 2017-04-12 PROCEDURE — 96374 THER/PROPH/DIAG INJ IV PUSH: CPT

## 2017-04-12 PROCEDURE — 85025 COMPLETE CBC W/AUTO DIFF WBC: CPT | Performed by: EMERGENCY MEDICINE

## 2017-04-12 RX ORDER — ACETAMINOPHEN 500 MG
1000 TABLET ORAL ONCE
Status: COMPLETED | OUTPATIENT
Start: 2017-04-12 | End: 2017-04-12

## 2017-04-12 RX ORDER — FUROSEMIDE 20 MG/1
20 TABLET ORAL DAILY
Qty: 7 TABLET | Refills: 0 | Status: SHIPPED | OUTPATIENT
Start: 2017-04-12 | End: 2017-04-17

## 2017-04-12 RX ORDER — METOPROLOL TARTRATE 5 MG/5ML
5 INJECTION INTRAVENOUS EVERY 5 MIN PRN
Status: DISCONTINUED | OUTPATIENT
Start: 2017-04-12 | End: 2017-04-12

## 2017-04-12 RX ORDER — FUROSEMIDE 10 MG/ML
40 INJECTION INTRAMUSCULAR; INTRAVENOUS ONCE
Status: COMPLETED | OUTPATIENT
Start: 2017-04-12 | End: 2017-04-12

## 2017-04-13 NOTE — ED INITIAL ASSESSMENT (HPI)
Patient arrives from Primary care doctors office with family c/o bilateral leg swelling x 2-3 weeks and SOB with exertion.  She has also had a 12 pound weight gain in the last 6 weeks

## 2017-04-13 NOTE — ED PROVIDER NOTES
Patient Seen in: [facility] Emergency Department    History   Patient presents with:  Swelling Edema (cardiovascular, metabolic)    Stated Complaint: edema.  fluid on lungs    HPI    Joint is 51-year-old female presenting to the emergency department Relation Age of Onset   • TB [Other] [OTHER] Mother    • TB [Other] [OTHER] Brother          Smoking Status: Former Smoker                   Packs/Day: 0.00  Years:           Quit date: 02/05/1987    Alcohol Use: Yes           0.0 oz/week       0 Standard Labs Reviewed   COMP METABOLIC PANEL (14) - Abnormal; Notable for the following:     GFR 57 (*)     All other components within normal limits   PRO BETA NATRIURETIC PEPTIDE - Abnormal; Notable for the following:     Pro-Beta Natriuretic Peptide 3823 Vicki Jerez infection.   The patient was discussed with cardiology services recommends 20 mg a day but 40 mg here patient on his scheduled follow-up with cardiology services on Friday and 2 days patient is return to the emergency room for worsening symptoms with compla

## 2017-04-19 ENCOUNTER — APPOINTMENT (OUTPATIENT)
Dept: LAB | Age: 82
End: 2017-04-19
Attending: INTERNAL MEDICINE
Payer: MEDICARE

## 2017-04-19 DIAGNOSIS — I48.20 CHRONIC ATRIAL FIBRILLATION (HCC): ICD-10-CM

## 2017-04-19 DIAGNOSIS — Z79.899 ENCOUNTER FOR LONG-TERM (CURRENT) DRUG USE: ICD-10-CM

## 2017-04-19 PROCEDURE — 80048 BASIC METABOLIC PNL TOTAL CA: CPT

## 2017-05-08 PROBLEM — Z79.01 LONG TERM (CURRENT) USE OF ANTICOAGULANTS: Status: ACTIVE | Noted: 2017-05-08

## 2017-10-31 PROBLEM — M54.6 CHRONIC LEFT-SIDED THORACIC BACK PAIN: Status: ACTIVE | Noted: 2017-10-31

## 2017-10-31 PROBLEM — G89.29 CHRONIC LEFT-SIDED THORACIC BACK PAIN: Status: ACTIVE | Noted: 2017-10-31

## 2018-01-03 PROBLEM — M85.80 OSTEOPENIA, UNSPECIFIED LOCATION: Status: ACTIVE | Noted: 2018-01-03

## 2018-07-10 PROCEDURE — 88305 TISSUE EXAM BY PATHOLOGIST: CPT | Performed by: RADIOLOGY

## 2018-07-16 PROBLEM — I10 ESSENTIAL HYPERTENSION: Status: ACTIVE | Noted: 2018-07-16

## 2019-02-28 PROBLEM — I48.20 CHRONIC ATRIAL FIBRILLATION (HCC): Status: ACTIVE | Noted: 2019-02-28

## 2019-07-09 PROBLEM — G56.03 BILATERAL CARPAL TUNNEL SYNDROME: Status: ACTIVE | Noted: 2019-07-09

## 2019-10-03 PROBLEM — I48.20 CHRONIC ATRIAL FIBRILLATION (HCC): Status: RESOLVED | Noted: 2019-02-28 | Resolved: 2019-10-03

## 2019-10-03 PROBLEM — R79.89 ELEVATED BRAIN NATRIURETIC PEPTIDE (BNP) LEVEL: Status: RESOLVED | Noted: 2017-02-05 | Resolved: 2019-10-03

## 2019-10-03 PROBLEM — Z51.5 PALLIATIVE CARE ENCOUNTER: Status: RESOLVED | Noted: 2017-02-09 | Resolved: 2019-10-03

## 2019-10-03 PROBLEM — R79.89 AZOTEMIA: Status: RESOLVED | Noted: 2017-02-05 | Resolved: 2019-10-03

## 2019-10-03 PROBLEM — E87.3 METABOLIC ALKALOSIS: Status: RESOLVED | Noted: 2017-02-05 | Resolved: 2019-10-03

## 2019-10-03 PROBLEM — E03.9 HYPOTHYROIDISM, UNSPECIFIED TYPE: Status: ACTIVE | Noted: 2019-10-03

## 2019-10-03 PROBLEM — R53.83 FATIGUE: Status: RESOLVED | Noted: 2017-02-05 | Resolved: 2019-10-03

## 2019-10-03 PROBLEM — Z71.89 COUNSELING REGARDING GOALS OF CARE: Status: RESOLVED | Noted: 2017-02-09 | Resolved: 2019-10-03

## 2019-11-16 ENCOUNTER — HOSPITAL ENCOUNTER (EMERGENCY)
Facility: HOSPITAL | Age: 84
Discharge: HOME | End: 2019-11-16
Attending: EMERGENCY MEDICINE
Payer: MEDICARE

## 2019-11-16 ENCOUNTER — APPOINTMENT (OUTPATIENT)
Dept: GENERAL RADIOLOGY | Facility: HOSPITAL | Age: 84
End: 2019-11-16
Attending: EMERGENCY MEDICINE
Payer: MEDICARE

## 2019-11-16 ENCOUNTER — APPOINTMENT (OUTPATIENT)
Dept: CV DIAGNOSTICS | Facility: HOSPITAL | Age: 84
End: 2019-11-16
Attending: EMERGENCY MEDICINE
Payer: MEDICARE

## 2019-11-16 ENCOUNTER — APPOINTMENT (OUTPATIENT)
Dept: ULTRASOUND IMAGING | Facility: HOSPITAL | Age: 84
End: 2019-11-16
Attending: EMERGENCY MEDICINE
Payer: MEDICARE

## 2019-11-16 VITALS
HEART RATE: 78 BPM | SYSTOLIC BLOOD PRESSURE: 125 MMHG | WEIGHT: 120 LBS | OXYGEN SATURATION: 98 % | RESPIRATION RATE: 16 BRPM | DIASTOLIC BLOOD PRESSURE: 47 MMHG | BODY MASS INDEX: 21 KG/M2

## 2019-11-16 DIAGNOSIS — N30.00 ACUTE CYSTITIS WITHOUT HEMATURIA: ICD-10-CM

## 2019-11-16 DIAGNOSIS — K80.20 CALCULUS OF GALLBLADDER WITHOUT CHOLECYSTITIS WITHOUT OBSTRUCTION: ICD-10-CM

## 2019-11-16 DIAGNOSIS — R07.9 CHEST PAIN OF UNCERTAIN ETIOLOGY: Primary | ICD-10-CM

## 2019-11-16 PROCEDURE — 93018 CV STRESS TEST I&R ONLY: CPT | Performed by: EMERGENCY MEDICINE

## 2019-11-16 PROCEDURE — 93010 ELECTROCARDIOGRAM REPORT: CPT

## 2019-11-16 PROCEDURE — 96361 HYDRATE IV INFUSION ADD-ON: CPT

## 2019-11-16 PROCEDURE — 87186 SC STD MICRODIL/AGAR DIL: CPT | Performed by: EMERGENCY MEDICINE

## 2019-11-16 PROCEDURE — 87086 URINE CULTURE/COLONY COUNT: CPT | Performed by: EMERGENCY MEDICINE

## 2019-11-16 PROCEDURE — 85025 COMPLETE CBC W/AUTO DIFF WBC: CPT | Performed by: EMERGENCY MEDICINE

## 2019-11-16 PROCEDURE — 83690 ASSAY OF LIPASE: CPT | Performed by: EMERGENCY MEDICINE

## 2019-11-16 PROCEDURE — 96366 THER/PROPH/DIAG IV INF ADDON: CPT

## 2019-11-16 PROCEDURE — 93350 STRESS TTE ONLY: CPT | Performed by: EMERGENCY MEDICINE

## 2019-11-16 PROCEDURE — 87077 CULTURE AEROBIC IDENTIFY: CPT | Performed by: EMERGENCY MEDICINE

## 2019-11-16 PROCEDURE — 93005 ELECTROCARDIOGRAM TRACING: CPT

## 2019-11-16 PROCEDURE — 93017 CV STRESS TEST TRACING ONLY: CPT | Performed by: EMERGENCY MEDICINE

## 2019-11-16 PROCEDURE — 81001 URINALYSIS AUTO W/SCOPE: CPT | Performed by: EMERGENCY MEDICINE

## 2019-11-16 PROCEDURE — 99285 EMERGENCY DEPT VISIT HI MDM: CPT

## 2019-11-16 PROCEDURE — 80053 COMPREHEN METABOLIC PANEL: CPT | Performed by: EMERGENCY MEDICINE

## 2019-11-16 PROCEDURE — 71045 X-RAY EXAM CHEST 1 VIEW: CPT | Performed by: EMERGENCY MEDICINE

## 2019-11-16 PROCEDURE — 96365 THER/PROPH/DIAG IV INF INIT: CPT

## 2019-11-16 PROCEDURE — 76700 US EXAM ABDOM COMPLETE: CPT | Performed by: EMERGENCY MEDICINE

## 2019-11-16 PROCEDURE — 85730 THROMBOPLASTIN TIME PARTIAL: CPT | Performed by: EMERGENCY MEDICINE

## 2019-11-16 PROCEDURE — 85610 PROTHROMBIN TIME: CPT | Performed by: EMERGENCY MEDICINE

## 2019-11-16 PROCEDURE — 84484 ASSAY OF TROPONIN QUANT: CPT | Performed by: EMERGENCY MEDICINE

## 2019-11-16 RX ORDER — CEPHALEXIN 500 MG/1
500 CAPSULE ORAL 4 TIMES DAILY
Qty: 40 CAPSULE | Refills: 0 | Status: SHIPPED | OUTPATIENT
Start: 2019-11-16 | End: 2019-11-26

## 2019-11-16 RX ORDER — NITROGLYCERIN 0.4 MG/1
0.4 TABLET SUBLINGUAL ONCE
Status: COMPLETED | OUTPATIENT
Start: 2019-11-16 | End: 2019-11-16

## 2019-11-16 RX ORDER — ASPIRIN 81 MG/1
324 TABLET, CHEWABLE ORAL ONCE
Status: COMPLETED | OUTPATIENT
Start: 2019-11-16 | End: 2019-11-16

## 2019-11-16 RX ORDER — SODIUM CHLORIDE 9 MG/ML
125 INJECTION, SOLUTION INTRAVENOUS CONTINUOUS
Status: DISCONTINUED | OUTPATIENT
Start: 2019-11-16 | End: 2019-11-16

## 2019-11-16 NOTE — ED INITIAL ASSESSMENT (HPI)
84YF c/c of chest pain pt state that she awoke a few hours ago with chest pain R sided chest pain and feeling that her heart was racing

## 2019-11-16 NOTE — ED NOTES
Cardiographics contacted for ETA for stress echo. States it will be at least another hour   Pt and family informed. Lunch tray ordered.

## 2019-11-16 NOTE — ED PROVIDER NOTES
Patient Seen in: [facility] Emergency Department      History   Patient presents with:  Chest Pain Angina (cardiovascular)    Stated Complaint: cp    HPI    Patient 80-year-old female with history of atrial fibrillation.   Patient states she went to No CVA tenderness, no midline bony tenderness. ABDOMEN: + Bowel sounds, soft, moderate tenderness right upper quadrant and epigastric and right flank, nondistended. No rebound, no guarding, no hepatosplenomegaly.   EXTREMITIES: Full range of motion, no te CBC W/ DIFFERENTIAL[[id number]]          Abnormal            Final result                 Please view results for these tests on the individual orders.    RAINBOW DRAW BLUE   RAINBOW DRAW LAVENDER   RAINBOW DRAW LIGHT GREEN   RAINBOW DRAW G etiology  (primary encounter diagnosis)  Calculus of gallbladder without cholecystitis without obstruction  Acute cystitis without hematuria    Disposition:  Discharge  11/16/2019  2:18 pm    Follow-up:  MD Carlito Diaz

## 2020-01-16 PROBLEM — I50.9 CONGESTIVE HEART FAILURE, UNSPECIFIED HF CHRONICITY, UNSPECIFIED HEART FAILURE TYPE (HCC): Status: ACTIVE | Noted: 2020-01-16

## 2020-06-30 PROBLEM — S32.020A: Status: ACTIVE | Noted: 2020-06-30

## 2020-07-01 PROBLEM — M81.0 OSTEOPOROSIS: Status: ACTIVE | Noted: 2018-01-03

## 2020-07-06 ENCOUNTER — LAB ENCOUNTER (OUTPATIENT)
Dept: LAB | Age: 85
End: 2020-07-06
Attending: PAIN MEDICINE
Payer: MEDICARE

## 2020-07-06 DIAGNOSIS — M80.08XG PATHOLOGICAL FRACTURE OF VERTEBRA DUE TO OSTEOPOROSIS WITH DELAYED HEALING, UNSPECIFIED OSTEOPOROSIS TYPE, SUBSEQUENT ENCOUNTER: ICD-10-CM

## 2020-07-06 DIAGNOSIS — S32.020A CLOSED WEDGE COMPRESSION FRACTURE OF L2 VERTEBRA, INITIAL ENCOUNTER (HCC): ICD-10-CM

## 2020-07-06 DIAGNOSIS — S32.000G CLOSED WEDGE FRACTURE OF LUMBAR VERTEBRA WITH DELAYED HEALING, UNSPECIFIED LUMBAR VERTEBRAL LEVEL, SUBSEQUENT ENCOUNTER: ICD-10-CM

## 2020-07-06 PROCEDURE — 88311 DECALCIFY TISSUE: CPT

## 2020-07-06 PROCEDURE — 88307 TISSUE EXAM BY PATHOLOGIST: CPT

## 2020-07-17 PROBLEM — I48.21 ATRIAL FIBRILLATION, PERMANENT (HCC): Status: ACTIVE | Noted: 2020-07-17

## 2020-09-22 PROBLEM — M48.062 SPINAL STENOSIS OF LUMBAR REGION WITH NEUROGENIC CLAUDICATION: Status: ACTIVE | Noted: 2020-09-22

## 2020-09-22 PROBLEM — M79.18 MYOFASCIAL PAIN: Status: ACTIVE | Noted: 2020-09-22

## 2020-09-22 PROBLEM — Z98.890 H/O KYPHOPLASTY: Status: ACTIVE | Noted: 2020-09-22

## 2020-10-07 PROBLEM — I48.21 ATRIAL FIBRILLATION, PERMANENT (HCC): Status: RESOLVED | Noted: 2020-07-17 | Resolved: 2020-10-07

## 2020-10-07 PROBLEM — S32.020A: Status: RESOLVED | Noted: 2020-06-30 | Resolved: 2020-10-07

## 2020-10-07 PROBLEM — R53.83 OTHER FATIGUE: Status: RESOLVED | Noted: 2017-02-05 | Resolved: 2020-10-07

## 2020-10-07 PROBLEM — I48.91 ATRIAL FIBRILLATION WITH RAPID VENTRICULAR RESPONSE (HCC): Status: RESOLVED | Noted: 2017-02-05 | Resolved: 2020-10-07

## 2020-11-16 PROBLEM — I48.91 ATRIAL FIBRILLATION, UNSPECIFIED TYPE (HCC): Status: ACTIVE | Noted: 2020-11-16

## 2021-03-04 PROBLEM — M47.816 LUMBAR FACET ARTHROPATHY: Status: ACTIVE | Noted: 2021-03-04

## 2021-06-10 ENCOUNTER — APPOINTMENT (OUTPATIENT)
Dept: GENERAL RADIOLOGY | Facility: HOSPITAL | Age: 86
End: 2021-06-10
Attending: EMERGENCY MEDICINE
Payer: MEDICARE

## 2021-06-10 ENCOUNTER — APPOINTMENT (OUTPATIENT)
Dept: MRI IMAGING | Facility: HOSPITAL | Age: 86
End: 2021-06-10
Attending: EMERGENCY MEDICINE
Payer: MEDICARE

## 2021-06-10 ENCOUNTER — HOSPITAL ENCOUNTER (EMERGENCY)
Facility: HOSPITAL | Age: 86
Discharge: HOME | End: 2021-06-10
Attending: EMERGENCY MEDICINE
Payer: MEDICARE

## 2021-06-10 ENCOUNTER — APPOINTMENT (OUTPATIENT)
Dept: CT IMAGING | Facility: HOSPITAL | Age: 86
End: 2021-06-10
Attending: EMERGENCY MEDICINE
Payer: MEDICARE

## 2021-06-10 VITALS
RESPIRATION RATE: 18 BRPM | TEMPERATURE: 98 F | WEIGHT: 121.25 LBS | SYSTOLIC BLOOD PRESSURE: 129 MMHG | OXYGEN SATURATION: 95 % | DIASTOLIC BLOOD PRESSURE: 80 MMHG | BODY MASS INDEX: 20 KG/M2 | HEART RATE: 98 BPM
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
## 2021-06-10 PROCEDURE — 72072 X-RAY EXAM THORAC SPINE 3VWS: CPT | Performed by: EMERGENCY MEDICINE

## 2021-06-10 PROCEDURE — 73110 X-RAY EXAM OF WRIST: CPT | Performed by: EMERGENCY MEDICINE

## 2021-06-10 PROCEDURE — 72148 MRI LUMBAR SPINE W/O DYE: CPT | Performed by: EMERGENCY MEDICINE

## 2021-06-10 PROCEDURE — 72146 MRI CHEST SPINE W/O DYE: CPT | Performed by: EMERGENCY MEDICINE

## 2021-06-10 PROCEDURE — 99284 EMERGENCY DEPT VISIT MOD MDM: CPT

## 2021-06-10 PROCEDURE — 99285 EMERGENCY DEPT VISIT HI MDM: CPT

## 2021-06-10 PROCEDURE — 72110 X-RAY EXAM L-2 SPINE 4/>VWS: CPT | Performed by: EMERGENCY MEDICINE

## 2021-06-10 PROCEDURE — 70450 CT HEAD/BRAIN W/O DYE: CPT | Performed by: EMERGENCY MEDICINE

## 2021-06-10 NOTE — ED INITIAL ASSESSMENT (HPI)
Slipped and fell yesterday afternoon. Fell backwards and hit back of head. No obvious injuries noted to head. She attempted to catch herself with both hands and also injured bilateral wrists.  There is swelling and bruising to her right wrist, but left wris

## 2021-06-10 NOTE — ED PROVIDER NOTES
Patient Seen in: [facility] Emergency Department      History   Patient presents with:  Fall    Stated Complaint: fall, hit back of head on thinner     HPI/Subjective:   HPI    Patient is an 17-year-old female who presents for evaluation after she f Pulmonary:      Effort: Pulmonary effort is normal.      Breath sounds: Normal breath sounds. Abdominal:      General: Bowel sounds are normal.      Palpations: Abdomen is soft. Musculoskeletal:         General: Normal range of motion.       Cervical VIEWS) (CPT=72110), 6/10/2021, 3:51 PM.  INDICATIONS:  fall, hit back of head on thinner  PATIENT STATED HISTORY: (As transcribed by Technologist)  Middle back pain after fall. FINDINGS:    BONES:  There has been previous kyphoplasty at L2.   There is se on 6/10/2021 at 3:24 PM     Finalized by (CST): Lulu Sidhu MD on 6/10/2021 at 3:26 PM       XR WRIST COMPLETE (MIN 3 VIEWS), RIGHT (CPT=73110)    Result Date: 6/10/2021  PROCEDURE:  XR WRIST COMPLETE (MIN 3 VIEWS), RIGHT (CPT=73110)  TECHNIQUE:  Three v MASTOIDS:          No sign of acute inflammation. SKULL:             No evidence for fracture or osseous abnormality. OTHER:             None. CONCLUSION:  No evidence of acute intracranial hemorrhage.   Probable mild chronic microvascular ischem canal stenosis. There is moderate bilateral subarticular and foraminal stenosis. L4-L5:  There is diffuse disc bulge. There is marked facet hypertrophy with marked ligamentum flavum thickening. There is moderate to severe central canal stenosis.   There demonstrate age-indeterminate compression fracture both T8 and T12. Discussed with the with the patient and her daughter. Patient states the T12 1 is a known fracture that is remote but she does not know that she has ever had the T8 fracture before.   In

## 2021-09-22 PROBLEM — E46 PROTEIN-CALORIE MALNUTRITION, UNSPECIFIED SEVERITY (HCC): Status: ACTIVE | Noted: 2021-09-22

## 2023-04-15 ENCOUNTER — HOSPITAL ENCOUNTER (EMERGENCY)
Facility: HOSPITAL | Age: 88
Discharge: HOME | End: 2023-04-15
Attending: EMERGENCY MEDICINE
Payer: MEDICARE

## 2023-04-15 ENCOUNTER — APPOINTMENT (OUTPATIENT)
Dept: CT IMAGING | Facility: HOSPITAL | Age: 88
End: 2023-04-15
Attending: EMERGENCY MEDICINE
Payer: MEDICARE

## 2023-04-15 ENCOUNTER — APPOINTMENT (OUTPATIENT)
Dept: GENERAL RADIOLOGY | Facility: HOSPITAL | Age: 88
End: 2023-04-15
Payer: MEDICARE

## 2023-04-15 VITALS
SYSTOLIC BLOOD PRESSURE: 120 MMHG | TEMPERATURE: 99 F | HEART RATE: 104 BPM | WEIGHT: 122 LBS | DIASTOLIC BLOOD PRESSURE: 60 MMHG | HEIGHT: 63 IN | BODY MASS INDEX: 21.62 KG/M2 | OXYGEN SATURATION: 96 % | RESPIRATION RATE: 25 BRPM

## 2023-04-15 DIAGNOSIS — J81.1 CHRONIC PULMONARY EDEMA: Primary | ICD-10-CM

## 2023-04-15 DIAGNOSIS — R53.1 WEAKNESS GENERALIZED: ICD-10-CM

## 2023-04-15 DIAGNOSIS — I48.20 ATRIAL FIBRILLATION, CHRONIC (HCC): ICD-10-CM

## 2023-04-15 LAB
ALBUMIN SERPL-MCNC: 3.3 G/DL (ref 3.4–5)
ALBUMIN/GLOB SERPL: 0.9 {RATIO} (ref 1–2)
ALP LIVER SERPL-CCNC: 76 U/L
ALT SERPL-CCNC: 15 U/L
ANION GAP SERPL CALC-SCNC: 3 MMOL/L (ref 0–18)
AST SERPL-CCNC: 13 U/L (ref 15–37)
BASOPHILS # BLD AUTO: 0.04 X10(3) UL (ref 0–0.2)
BASOPHILS NFR BLD AUTO: 0.4 %
BILIRUB SERPL-MCNC: 1.1 MG/DL (ref 0.1–2)
BUN BLD-MCNC: 16 MG/DL (ref 7–18)
CALCIUM BLD-MCNC: 9.3 MG/DL (ref 8.5–10.1)
CHLORIDE SERPL-SCNC: 108 MMOL/L (ref 98–112)
CO2 SERPL-SCNC: 26 MMOL/L (ref 21–32)
CREAT BLD-MCNC: 0.88 MG/DL
EOSINOPHIL # BLD AUTO: 0.08 X10(3) UL (ref 0–0.7)
EOSINOPHIL NFR BLD AUTO: 0.7 %
ERYTHROCYTE [DISTWIDTH] IN BLOOD BY AUTOMATED COUNT: 14.1 %
FLUAV + FLUBV RNA SPEC NAA+PROBE: NEGATIVE
FLUAV + FLUBV RNA SPEC NAA+PROBE: NEGATIVE
GFR SERPLBLD BASED ON 1.73 SQ M-ARVRAT: 64 ML/MIN/1.73M2 (ref 60–?)
GLOBULIN PLAS-MCNC: 3.6 G/DL (ref 2.8–4.4)
GLUCOSE BLD-MCNC: 109 MG/DL (ref 70–99)
HCT VFR BLD AUTO: 42.1 %
HGB BLD-MCNC: 13.9 G/DL
IMM GRANULOCYTES # BLD AUTO: 0.08 X10(3) UL (ref 0–1)
IMM GRANULOCYTES NFR BLD: 0.7 %
INR BLD: 2.47 (ref 0.85–1.16)
LYMPHOCYTES # BLD AUTO: 1.16 X10(3) UL (ref 1–4)
LYMPHOCYTES NFR BLD AUTO: 10.8 %
MCH RBC QN AUTO: 31.7 PG (ref 26–34)
MCHC RBC AUTO-ENTMCNC: 33 G/DL (ref 31–37)
MCV RBC AUTO: 96.1 FL
MONOCYTES # BLD AUTO: 1.41 X10(3) UL (ref 0.1–1)
MONOCYTES NFR BLD AUTO: 13.2 %
NEUTROPHILS # BLD AUTO: 7.94 X10 (3) UL (ref 1.5–7.7)
NEUTROPHILS # BLD AUTO: 7.94 X10(3) UL (ref 1.5–7.7)
NEUTROPHILS NFR BLD AUTO: 74.2 %
NT-PROBNP SERPL-MCNC: 5714 PG/ML (ref ?–450)
OSMOLALITY SERPL CALC.SUM OF ELEC: 286 MOSM/KG (ref 275–295)
PLATELET # BLD AUTO: 242 10(3)UL (ref 150–450)
POTASSIUM SERPL-SCNC: 4.2 MMOL/L (ref 3.5–5.1)
PROT SERPL-MCNC: 6.9 G/DL (ref 6.4–8.2)
PROTHROMBIN TIME: 26.5 SECONDS (ref 11.6–14.8)
Q-T INTERVAL: 348 MS
QRS DURATION: 68 MS
QTC CALCULATION (BEZET): 453 MS
R AXIS: 98 DEGREES
RBC # BLD AUTO: 4.38 X10(6)UL
RSV RNA SPEC NAA+PROBE: NEGATIVE
SARS-COV-2 RNA RESP QL NAA+PROBE: NOT DETECTED
SARS-COV-2 RNA RESP QL NAA+PROBE: NOT DETECTED
SODIUM SERPL-SCNC: 137 MMOL/L (ref 136–145)
T AXIS: 52 DEGREES
TROPONIN I HIGH SENSITIVITY: 8 NG/L
VENTRICULAR RATE: 102 BPM
WBC # BLD AUTO: 10.7 X10(3) UL (ref 4–11)

## 2023-04-15 PROCEDURE — 80053 COMPREHEN METABOLIC PANEL: CPT | Performed by: EMERGENCY MEDICINE

## 2023-04-15 PROCEDURE — 93010 ELECTROCARDIOGRAM REPORT: CPT

## 2023-04-15 PROCEDURE — 96375 TX/PRO/DX INJ NEW DRUG ADDON: CPT

## 2023-04-15 PROCEDURE — 99285 EMERGENCY DEPT VISIT HI MDM: CPT

## 2023-04-15 PROCEDURE — 80053 COMPREHEN METABOLIC PANEL: CPT

## 2023-04-15 PROCEDURE — 71275 CT ANGIOGRAPHY CHEST: CPT | Performed by: EMERGENCY MEDICINE

## 2023-04-15 PROCEDURE — 71045 X-RAY EXAM CHEST 1 VIEW: CPT

## 2023-04-15 PROCEDURE — 0241U SARS-COV-2/FLU A AND B/RSV BY PCR (GENEXPERT): CPT | Performed by: EMERGENCY MEDICINE

## 2023-04-15 PROCEDURE — 85025 COMPLETE CBC W/AUTO DIFF WBC: CPT | Performed by: EMERGENCY MEDICINE

## 2023-04-15 PROCEDURE — 85610 PROTHROMBIN TIME: CPT | Performed by: EMERGENCY MEDICINE

## 2023-04-15 PROCEDURE — 96374 THER/PROPH/DIAG INJ IV PUSH: CPT

## 2023-04-15 PROCEDURE — 93005 ELECTROCARDIOGRAM TRACING: CPT

## 2023-04-15 PROCEDURE — 84484 ASSAY OF TROPONIN QUANT: CPT | Performed by: EMERGENCY MEDICINE

## 2023-04-15 PROCEDURE — 85025 COMPLETE CBC W/AUTO DIFF WBC: CPT

## 2023-04-15 PROCEDURE — 83880 ASSAY OF NATRIURETIC PEPTIDE: CPT | Performed by: EMERGENCY MEDICINE

## 2023-04-15 RX ORDER — FUROSEMIDE 40 MG/1
40 TABLET ORAL 2 TIMES DAILY
Qty: 20 TABLET | Refills: 0 | Status: SHIPPED | OUTPATIENT
Start: 2023-04-15 | End: 2023-04-25

## 2023-04-15 RX ORDER — PREDNISONE 20 MG/1
20 TABLET ORAL DAILY
Qty: 5 TABLET | Refills: 0 | Status: SHIPPED | OUTPATIENT
Start: 2023-04-15 | End: 2023-04-20

## 2023-04-15 RX ORDER — DILTIAZEM HYDROCHLORIDE 240 MG/1
240 CAPSULE, EXTENDED RELEASE ORAL DAILY
Qty: 60 CAPSULE | Refills: 0 | Status: SHIPPED | OUTPATIENT
Start: 2023-04-15 | End: 2023-06-14

## 2023-04-15 RX ORDER — DILTIAZEM HYDROCHLORIDE 5 MG/ML
10 INJECTION INTRAVENOUS ONCE
Status: COMPLETED | OUTPATIENT
Start: 2023-04-15 | End: 2023-04-15

## 2023-04-15 RX ORDER — FUROSEMIDE 10 MG/ML
20 INJECTION INTRAMUSCULAR; INTRAVENOUS ONCE
Status: COMPLETED | OUTPATIENT
Start: 2023-04-15 | End: 2023-04-15

## 2023-04-15 RX ORDER — DILTIAZEM HYDROCHLORIDE 120 MG/1
120 CAPSULE, EXTENDED RELEASE ORAL ONCE
Status: COMPLETED | OUTPATIENT
Start: 2023-04-15 | End: 2023-04-15

## 2023-04-15 RX ORDER — PREDNISONE 20 MG/1
40 TABLET ORAL ONCE
Status: COMPLETED | OUTPATIENT
Start: 2023-04-15 | End: 2023-04-15

## 2023-04-15 RX ORDER — DILTIAZEM HYDROCHLORIDE 120 MG/1
120 CAPSULE, EXTENDED RELEASE ORAL DAILY
Status: DISCONTINUED | OUTPATIENT
Start: 2023-04-15 | End: 2023-04-15

## 2023-04-15 NOTE — ED INITIAL ASSESSMENT (HPI)
Pt was not feeling well last night O2 sat 88% on RA last night. Almost fell going to bathroom, feeling weak today. No fever. Pt reports feeling short of breath. O2 sat 85% on RA. Pt reports only using O2 at 2L per NC at home as needed only. Pt placed on O2 at 2L at TL pt still at 86%. O2 increased to 6L now 90%.

## 2023-09-07 ENCOUNTER — APPOINTMENT (OUTPATIENT)
Dept: GENERAL RADIOLOGY | Facility: HOSPITAL | Age: 88
End: 2023-09-07
Attending: EMERGENCY MEDICINE
Payer: MEDICARE

## 2023-09-07 ENCOUNTER — HOSPITAL ENCOUNTER (EMERGENCY)
Facility: HOSPITAL | Age: 88
Discharge: HOME | End: 2023-09-07
Attending: EMERGENCY MEDICINE
Payer: MEDICARE

## 2023-09-07 VITALS
TEMPERATURE: 98 F | WEIGHT: 118 LBS | DIASTOLIC BLOOD PRESSURE: 54 MMHG | BODY MASS INDEX: 20.91 KG/M2 | SYSTOLIC BLOOD PRESSURE: 113 MMHG | RESPIRATION RATE: 21 BRPM | HEART RATE: 103 BPM | OXYGEN SATURATION: 94 % | HEIGHT: 63 IN

## 2023-09-07 DIAGNOSIS — S20.211A RIB CONTUSION, RIGHT, INITIAL ENCOUNTER: Primary | ICD-10-CM

## 2023-09-07 LAB
ANION GAP SERPL CALC-SCNC: 4 MMOL/L (ref 0–18)
BASOPHILS # BLD AUTO: 0.04 X10(3) UL (ref 0–0.2)
BASOPHILS NFR BLD AUTO: 0.4 %
BUN BLD-MCNC: 26 MG/DL (ref 7–18)
CALCIUM BLD-MCNC: 9.4 MG/DL (ref 8.5–10.1)
CHLORIDE SERPL-SCNC: 106 MMOL/L (ref 98–112)
CO2 SERPL-SCNC: 32 MMOL/L (ref 21–32)
CREAT BLD-MCNC: 1.09 MG/DL
EGFRCR SERPLBLD CKD-EPI 2021: 49 ML/MIN/1.73M2 (ref 60–?)
EOSINOPHIL # BLD AUTO: 0.28 X10(3) UL (ref 0–0.7)
EOSINOPHIL NFR BLD AUTO: 2.7 %
ERYTHROCYTE [DISTWIDTH] IN BLOOD BY AUTOMATED COUNT: 16.5 %
GLUCOSE BLD-MCNC: 118 MG/DL (ref 70–99)
HCT VFR BLD AUTO: 35.7 %
HGB BLD-MCNC: 11.4 G/DL
IMM GRANULOCYTES # BLD AUTO: 0.14 X10(3) UL (ref 0–1)
IMM GRANULOCYTES NFR BLD: 1.4 %
INR BLD: 1.77 (ref 0.85–1.16)
LYMPHOCYTES # BLD AUTO: 0.71 X10(3) UL (ref 1–4)
LYMPHOCYTES NFR BLD AUTO: 6.9 %
MCH RBC QN AUTO: 31.4 PG (ref 26–34)
MCHC RBC AUTO-ENTMCNC: 31.9 G/DL (ref 31–37)
MCV RBC AUTO: 98.3 FL
MONOCYTES # BLD AUTO: 1.16 X10(3) UL (ref 0.1–1)
MONOCYTES NFR BLD AUTO: 11.3 %
NEUTROPHILS # BLD AUTO: 7.95 X10 (3) UL (ref 1.5–7.7)
NEUTROPHILS # BLD AUTO: 7.95 X10(3) UL (ref 1.5–7.7)
NEUTROPHILS NFR BLD AUTO: 77.3 %
OSMOLALITY SERPL CALC.SUM OF ELEC: 300 MOSM/KG (ref 275–295)
PLATELET # BLD AUTO: 278 10(3)UL (ref 150–450)
POTASSIUM SERPL-SCNC: 4.8 MMOL/L (ref 3.5–5.1)
PROTHROMBIN TIME: 20.4 SECONDS (ref 11.6–14.8)
RBC # BLD AUTO: 3.63 X10(6)UL
SODIUM SERPL-SCNC: 142 MMOL/L (ref 136–145)
WBC # BLD AUTO: 10.3 X10(3) UL (ref 4–11)

## 2023-09-07 PROCEDURE — 80048 BASIC METABOLIC PNL TOTAL CA: CPT | Performed by: EMERGENCY MEDICINE

## 2023-09-07 PROCEDURE — 85610 PROTHROMBIN TIME: CPT | Performed by: EMERGENCY MEDICINE

## 2023-09-07 PROCEDURE — 85025 COMPLETE CBC W/AUTO DIFF WBC: CPT | Performed by: EMERGENCY MEDICINE

## 2023-09-07 PROCEDURE — 99285 EMERGENCY DEPT VISIT HI MDM: CPT

## 2023-09-07 PROCEDURE — 71101 X-RAY EXAM UNILAT RIBS/CHEST: CPT | Performed by: EMERGENCY MEDICINE

## 2023-09-07 PROCEDURE — 36415 COLL VENOUS BLD VENIPUNCTURE: CPT

## 2023-09-07 PROCEDURE — 99283 EMERGENCY DEPT VISIT LOW MDM: CPT

## 2023-09-07 RX ORDER — ACETAMINOPHEN 500 MG
1000 TABLET ORAL ONCE
Status: COMPLETED | OUTPATIENT
Start: 2023-09-07 | End: 2023-09-07

## 2023-09-07 RX ORDER — HYDROCODONE BITARTRATE AND ACETAMINOPHEN 5; 325 MG/1; MG/1
1 TABLET ORAL EVERY 8 HOURS PRN
Qty: 10 TABLET | Refills: 0 | Status: SHIPPED | OUTPATIENT
Start: 2023-09-07 | End: 2023-09-14

## 2023-09-07 NOTE — ED INITIAL ASSESSMENT (HPI)
Pt arrives to ED via EMS with c/o of mid to upper back pain after her daughter attempted to get out of bed. Pt states she fell to the floor, pt states she hit her back on her bed frame. Pt has hx of A-Fib and is on blood thinners At this time the pt has not other complaints.    Pt has COPD ignacio arrives to ED on 4L NC, pt states she is typically on 3L NC.

## (undated) NOTE — ED AVS SNAPSHOT
[facility] Emergency Department    [place]  [address] [address]    Phone:  [phone]    Fax:  [phone]   MRN: [id number]    Department:  [facility] Emergency Department   Date of Visit:  4/12/ IF THERE IS ANY CHANGE OR WORSENING OF YOUR CONDITION, CALL YOUR PRIMARY CARE PHYSICIAN AT ONCE OR RETURN IMMEDIATELY TO THE EMERGENCY DEPARTMENT.     If you have been prescribed any medication(s), please fill your prescription right away and begin taking t

## (undated) NOTE — ED AVS SNAPSHOT
[facility] Emergency Department    [place]  [address] [address]    Phone:  [phone]    Fax:  [phone]   MRN: [id number]    Department:  [facility] Emergency Department   Date of Visit:  4/12/ Follow the directions for taking your medications provided by your doctor. Please ask your health care provider, pharmacist or nurse if you have any questions regarding your home medications, including potential side effects.               Medication List visit does not uncover every injury or illness.  If you have been referred to a primary care or a specialist physician for a follow-up visit, please tell this physician (or your personal doctor if your instructions are to return to your personal doctor) abo Javier Crouch [phone] [facility] ([address]) [phone]                Additional Information       We are concerned for your overall well being:    - If you are a smoker or have smoked in the last 12 months, we encourage you to explore options for [facility]

## (undated) NOTE — ED AVS SNAPSHOT
Damian Jeffers   MRN: [id number]    Department:  [facility] Emergency Department   Date of Visit:  11/16/2019           Disclosure     Insurance plans vary and the physician(s) referred by the ER may not be covered by your plan.  Please contact y tell this physician (or your personal doctor if your instructions are to return to your personal doctor) about any new or lasting problems. The primary care or specialist physician will see patients referred from the [facility] Emergency Department.  Shelton Lombard